# Patient Record
(demographics unavailable — no encounter records)

---

## 2025-05-01 NOTE — ASSESSMENT
[FreeTextEntry1] : cerumen impaction was removed.  Plan -Findings and management options were discussed with the patient, his aide, and his daughter - continue good ear hygiene   - I recommended a repeat audiogram to check his hearing.  They declined.    - If he has hearing aids, I do recommend that he wear them.    - Follow-up in 6 months to check for wax buildup - Call and return earlier if any problems

## 2025-05-01 NOTE — PHYSICAL EXAM
[TextEntry] : General: The patient was alert, oriented and in no distress. Voice was clear.  Face: The patient had no facial asymmetry or mass. The skin was unremarkable.  Ears:  External ears were normal without deformity. Ear canals- cerumen impaction  PROCEDURE- EAR MICROSCOPY AND CERUMEN REMOVAL  Indication: cerumen removal  Under the microscope, obstructing cerumen was removed atraumatically from the both ears with a suction, curette and/or forceps.  Canals: dry skin.  no inflammation.  Tympanic membranes: Intact. no perforation or effusion.  Nose: The external nose had no significant deformity.   On anterior rhinoscopy, the nasal mucosa was normal.  The anterior septum was grossly midline. There were no visualized polyps, purulence  or masses.  Oral cavity: Oral mucosa- normal. Oral and base of tongue- clear and without mass. Gingival and buccal mucosa- moist and without lesions. Palate- the palate moved well. There was no cleft palate. There appeared to be good salivary flow.  Oral cavity/oropharynx- no pus, erythema or mass  Neck: The neck was symmetrical. The parotid and submandibular glands were normal without masses. The trachea was midline and there was no unusual crepitus. Thyroid was smooth and nontender and no masses were palpated. No masses  Lymphatics: Cervical adenopathy- none.

## 2025-05-01 NOTE — HISTORY OF PRESENT ILLNESS
[de-identified] : ELSA KAPLAN is a 92 year old patient With a history of hearing loss here for follow-up.  He was again accompanied by his aide who was able to assist with translation.  I also spoke with his daughter by phone.  No known ear pain or drainage.  He has hearing aids but does not use them.  ENT history No history of recurrent middle ear infections or prior otologic surgery